# Patient Record
Sex: MALE | Race: WHITE | URBAN - METROPOLITAN AREA
[De-identification: names, ages, dates, MRNs, and addresses within clinical notes are randomized per-mention and may not be internally consistent; named-entity substitution may affect disease eponyms.]

---

## 2017-02-26 ENCOUNTER — OFFICE VISIT (OUTPATIENT)
Dept: URGENT CARE | Facility: CLINIC | Age: 22
End: 2017-02-26

## 2017-02-26 VITALS
OXYGEN SATURATION: 100 % | WEIGHT: 146 LBS | HEART RATE: 75 BPM | SYSTOLIC BLOOD PRESSURE: 130 MMHG | HEIGHT: 71 IN | RESPIRATION RATE: 18 BRPM | DIASTOLIC BLOOD PRESSURE: 80 MMHG | BODY MASS INDEX: 20.44 KG/M2 | TEMPERATURE: 98.6 F
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
## 2017-02-26 PROCEDURE — 99213 OFFICE O/P EST LOW 20 MIN: CPT | Performed by: PHYSICIAN ASSISTANT

## 2017-02-26 RX ORDER — DEXAMETHASONE 4 MG/1
8 TABLET ORAL DAILY
Qty: 6 TAB | Refills: 0 | Status: SHIPPED | OUTPATIENT
Start: 2017-02-26 | End: 2017-03-01

## 2017-02-26 RX ORDER — AMOXICILLIN AND CLAVULANATE POTASSIUM 875; 125 MG/1; MG/1
1 TABLET, FILM COATED ORAL 2 TIMES DAILY
Qty: 20 TAB | Refills: 0 | Status: SHIPPED | OUTPATIENT
Start: 2017-02-26 | End: 2017-03-08

## 2017-02-26 RX ORDER — HYDROCODONE BITARTRATE AND ACETAMINOPHEN 5; 325 MG/1; MG/1
1 TABLET ORAL EVERY 6 HOURS PRN
Qty: 14 TAB | Refills: 0 | Status: SHIPPED | OUTPATIENT
Start: 2017-02-26

## 2017-02-26 ASSESSMENT — ENCOUNTER SYMPTOMS
CONSTITUTIONAL NEGATIVE: 1
EYES NEGATIVE: 1
NEUROLOGICAL NEGATIVE: 1
MUSCULOSKELETAL NEGATIVE: 1
SWOLLEN GLANDS: 0

## 2017-02-26 NOTE — PROGRESS NOTES
"Subjective:      Mohamud Hines is a 21 y.o. male who presents with Dog Bite            Other  This is a new problem. The current episode started today (dogbite face, broke skin). The problem occurs constantly. The problem has been unchanged. Pertinent negatives include no swollen glands. Nothing aggravates the symptoms. He has tried rest for the symptoms. The treatment provided mild relief.       Review of Systems   Constitutional: Negative.    HENT: Negative.    Eyes: Negative.    Musculoskeletal: Negative.    Skin: Negative.    Neurological: Negative.           Objective:     /80 mmHg  Pulse 75  Temp(Src) 37 °C (98.6 °F)  Resp 18  Ht 1.803 m (5' 11\")  Wt 66.225 kg (146 lb)  BMI 20.37 kg/m2  SpO2 100%     Physical Exam   Constitutional: He is oriented to person, place, and time. He appears well-developed and well-nourished. No distress.   HENT:   Head: Normocephalic and atraumatic.   Small lac nare; cleaned w/hibiclens, Dermabond closure to anterior aspect   Eyes: Conjunctivae are normal. Pupils are equal, round, and reactive to light.   Neck: Normal range of motion. Neck supple.   Lymphadenopathy:     He has no cervical adenopathy.   Neurological: He is alert and oriented to person, place, and time.   Skin: Skin is warm and dry.   Psychiatric: He has a normal mood and affect. His behavior is normal. Judgment and thought content normal.   Nursing note and vitals reviewed.    Filed Vitals:    02/26/17 1442   BP: 130/80   Pulse: 75   Temp: 37 °C (98.6 °F)   Resp: 18   Height: 1.803 m (5' 11\")   Weight: 66.225 kg (146 lb)   SpO2: 100%     Active Ambulatory Problems     Diagnosis Date Noted   • No Active Ambulatory Problems     Resolved Ambulatory Problems     Diagnosis Date Noted   • No Resolved Ambulatory Problems     Past Medical History   Diagnosis Date   • Fracture      Current Outpatient Prescriptions on File Prior to Visit   Medication Sig Dispense Refill   • azithromycin (ZITHROMAX) 250 MG TABS " Take  by mouth every day. 2 tabs by mouth day 1, 1 tab by mouth days 2-5 1 Each 0   • hydrocodone-acetaminophen (LORTAB 5) 5-500 MG TABS Take 1 Tab by mouth every 6 hours as needed. for severe pain 10 Each 0     No current facility-administered medications on file prior to visit.     Gargles, Cepacol lozenges, Aleve/Advil as needed for throat pain  History reviewed. No pertinent family history.  Review of patient's allergies indicates no known allergies.              Assessment/Plan:     ·  dogbite face      · Daily wound care; abx rx

## 2017-02-26 NOTE — Clinical Note
February 26, 2017         Patient: Mohamud Hines   YOB: 1995   Date of Visit: 2/26/2017           To Whom it May Concern:    Mohamud Hines was seen in my clinic  please excuse him for today 2/26/17    If you have any questions or concerns, please don't hesitate to call.        Sincerely,           Delmer Collins PA-C  Electronically Signed

## 2017-02-26 NOTE — MR AVS SNAPSHOT
"Mohamud Hines   2017 2:45 PM   Office Visit   MRN: [id number]    Department:  [facility]   Dept Phone:  [phone]    Description:  Male : 1995   Provider:  Delmer Collins PA-C           Reason for Visit     Dog Bite face      Allergies as of 2017     No Known Allergies      You were diagnosed with     Dog bite, initial encounter   [[id number]]         Vital Signs     Blood Pressure Pulse Temperature Respirations Height Weight    130/80 mmHg 75 37 °C (98.6 °F) 18 1.803 m (5' 11\") 66.225 kg (146 lb)    Body Mass Index Oxygen Saturation                20.37 kg/m2 100%          Basic Information     Date Of Birth Sex Race Ethnicity Preferred Language    1995 Male White Unknown English      Health Maintenance        Date Due Completion Dates    IMM HEP A VACCINE (1 of 2 - Standard Series) 1995 ---    IMM VARICELLA (CHICKENPOX) VACCINE (2 of 2 - 2 Dose Childhood Series) 1998    IMM HPV VACCINE (1 of 3 - Male 3 Dose Series) 2005 ---    IMM DTaP/Tdap/Td Vaccine (6 - Tdap) 2005 9/15/1998, 1995, 1994, 1994, 1994    IMM INFLUENZA (1) 2016 ---            Current Immunizations     DTP/HIB Combined Vaccine 1995, 1994, 1994    Dtap Vaccine 9/15/1998, 1995    HIB Vaccine (ACTHIB/HIBERIX) 1995    Hepatitis B Vaccine Non-Recombivax (Ped/Adol) 1994, 1994, 1994    INFLUENZA VACCINE H1N1 2009    MMR Vaccine 9/15/1998, 1995    OPV - Historical Data 9/15/1998, 1995, 1994, 1994    Varicella Vaccine Live 1997      Below and/or attached are the medications your provider expects you to take. Review all of your home medications and newly ordered medications with your provider and/or pharmacist. Follow medication instructions as directed by your provider and/or pharmacist. Please keep your medication list with you and share with your provider. Update the information when medications are " discontinued, doses are changed, or new medications (including over-the-counter products) are added; and carry medication information at all times in the event of emergency situations     Allergies:  No Known Allergies          Medications  Valid as of: February 26, 2017 -  3:16 PM    Generic Name Brand Name Tablet Size Instructions for use    Amoxicillin-Pot Clavulanate (Tab) AUGMENTIN 875-125 MG Take 1 Tab by mouth 2 times a day for 10 days.        Azithromycin (Tab) ZITHROMAX 250 MG Take  by mouth every day. 2 tabs by mouth day 1, 1 tab by mouth days 2-5        Dexamethasone (Tab) DECADRON 4 MG Take 2 Tabs by mouth every day for 3 days.        Hydrocodone-Acetaminophen (Tab) VICODIN 5-500 MG Take 1 Tab by mouth every 6 hours as needed. for severe pain        Hydrocodone-Acetaminophen (Tab) NORCO 5-325 MG Take 1 Tab by mouth every 6 hours as needed.        .                 Medicines prescribed today were sent to:     [id number] - [place], [place] - [address]    [address] [address]    Phone: [phone] Fax: [phone]    Open 24 Hours?: No    [id number] - [place], [place] - [address] AT [facility] [address]    [address] [place] [address]    Phone: [phone] Fax: [phone]    Open 24 Hours?: No      Medication refill instructions:       If your prescription bottle indicates you have medication refills left, it is not necessary to call your provider’s office. Please contact your pharmacy and they will refill your medication.    If your prescription bottle indicates you do not have any refills left, you may request refills at any time through one of the following ways: The online [employer] system (except Urgent Care), by calling your provider’s office, or by asking your pharmacy to contact your provider’s office with a refill request. Medication refills are processed only during regular business hours and may not be available until the next business  day. Your provider may request additional information or to have a follow-up visit with you prior to refilling your medication.   *Please Note: Medication refills are assigned a new Rx number when refilled electronically. Your pharmacy may indicate that no refills were authorized even though a new prescription for the same medication is available at the pharmacy. Please request the medicine by name with the pharmacy before contacting your provider for a refill.           Ecorithm Access Code: [email or web address]  Expires: 3/28/2017  3:16 PM    Your email address is not on file at [facility].  Email Addresses are required for you to sign up for Ecorithm, please contact [phone] to verify your personal information and to provide your email address prior to attempting to register for Ecorithm.    Mohamud Donny  [facility] [place] [address]    Ecorithm  A secure, online tool to manage your health information     [facility]’s Ecorithm® is a secure, online tool that connects you to your personalized health information from the privacy of your home -- day or night - making it very easy for you to manage your healthcare. Once the activation process is completed, you can even access your medical information using the Ecorithm taiwo, which is available for free in the Apple Taiwo store or Google Play store.     To learn more about Ecorithm, visit [email or web address]    There are two levels of access available (as shown below):   My Chart Features  [facility] Primary Care Doctor [facility]  Specialists [facility]  Urgent  Care Non-[facility] Primary Care Doctor   Email your healthcare team securely and privately 24/7 X X X    Manage appointments: schedule your next appointment; view details of past/upcoming appointments X      Request prescription refills. X      View recent personal medical records, including lab and immunizations X X X X   View health record, including health history, allergies, medications X X X X   Read  reports about your outpatient visits, procedures, consult and ER notes X X X X   See your discharge summary, which is a recap of your hospital and/or ER visit that includes your diagnosis, lab results, and care plan X X  X     How to register for [employer]:  Once your e-mail address has been verified, follow the following steps to sign up for Toolmeet.     1. Go to  [email or web address]  2. Click on the Sign Up Now box, which takes you to the New Member Sign Up page. You will need to provide the following information:  a. Enter your Toolmeet Access Code exactly as it appears at the top of this page. (You will not need to use this code after you’ve completed the sign-up process. If you do not sign up before the expiration date, you must request a new code.)   b. Enter your date of birth.   c. Enter your home email address.   d. Click Submit, and follow the next screen’s instructions.  3. Create a Toolmeet ID. This will be your Toolmeet login ID and cannot be changed, so think of one that is secure and easy to remember.  4. Create a Toolmeet password. You can change your password at any time.  5. Enter your Password Reset Question and Answer. This can be used at a later time if you forget your password.   6. Enter your e-mail address. This allows you to receive e-mail notifications when new information is available in Toolmeet.  7. Click Sign Up. You can now view your health information.    For assistance activating your Toolmeet account, call [phone]